# Patient Record
Sex: FEMALE | Race: WHITE | URBAN - METROPOLITAN AREA
[De-identification: names, ages, dates, MRNs, and addresses within clinical notes are randomized per-mention and may not be internally consistent; named-entity substitution may affect disease eponyms.]

---

## 2017-02-28 ENCOUNTER — RADIANT APPOINTMENT (OUTPATIENT)
Dept: CT IMAGING | Facility: CLINIC | Age: 77
End: 2017-02-28
Payer: COMMERCIAL

## 2017-02-28 DIAGNOSIS — Z13.9 SCREENING: ICD-10-CM

## 2017-02-28 PROCEDURE — 75571 CT HRT W/O DYE W/CA TEST: CPT | Performed by: INTERNAL MEDICINE

## 2017-03-01 ENCOUNTER — TELEPHONE (OUTPATIENT)
Dept: GENERAL RADIOLOGY | Facility: CLINIC | Age: 77
End: 2017-03-01

## 2017-03-02 ENCOUNTER — TELEPHONE (OUTPATIENT)
Dept: GENERAL RADIOLOGY | Facility: CLINIC | Age: 77
End: 2017-03-02

## 2017-03-02 NOTE — TELEPHONE ENCOUNTER
Called Jeni with results of CT calcium score which was 79. This means the patient has plaque present and a moderate risk of having a heart attack at this time. Encouraged patient to follow up with PCP and reviewed ways to reduce risk of developing heart disease such as exercise regularly, eat a healthy diet full of fruits and vegetables, decrease risk factors by no smoking, limiting alcohol intake and stress. All questions answered, patient verbalized understanding.